# Patient Record
Sex: MALE | Race: OTHER | Employment: FULL TIME | URBAN - METROPOLITAN AREA
[De-identification: names, ages, dates, MRNs, and addresses within clinical notes are randomized per-mention and may not be internally consistent; named-entity substitution may affect disease eponyms.]

---

## 2022-06-02 ENCOUNTER — HOSPITAL ENCOUNTER (EMERGENCY)
Age: 22
Discharge: HOME | End: 2022-06-02
Attending: EMERGENCY MEDICINE
Payer: MEDICAID

## 2022-06-02 VITALS
RESPIRATION RATE: 18 BRPM | BODY MASS INDEX: 20.05 KG/M2 | OXYGEN SATURATION: 100 % | DIASTOLIC BLOOD PRESSURE: 63 MMHG | WEIGHT: 132.28 LBS | HEIGHT: 68 IN | TEMPERATURE: 98.4 F | HEART RATE: 67 BPM | SYSTOLIC BLOOD PRESSURE: 115 MMHG

## 2022-06-02 DIAGNOSIS — Z20.822 PERSON UNDER INVESTIGATION FOR COVID-19: ICD-10-CM

## 2022-06-02 DIAGNOSIS — J06.9 VIRAL URI WITH COUGH: Primary | ICD-10-CM

## 2022-06-02 LAB — SARS-COV-2, COV2: NORMAL

## 2022-06-02 PROCEDURE — 99283 EMERGENCY DEPT VISIT LOW MDM: CPT

## 2022-06-02 PROCEDURE — U0003 INFECTIOUS AGENT DETECTION BY NUCLEIC ACID (DNA OR RNA); SEVERE ACUTE RESPIRATORY SYNDROME CORONAVIRUS 2 (SARS-COV-2) (CORONAVIRUS DISEASE [COVID-19]), AMPLIFIED PROBE TECHNIQUE, MAKING USE OF HIGH THROUGHPUT TECHNOLOGIES AS DESCRIBED BY CMS-2020-01-R: HCPCS

## 2022-06-02 NOTE — Clinical Note
[facility] [place]  [facility]  [address]  [facility] [address] [phone]    Work/School Note    Date: 6/2/2022     To Whom It May concern:    Leyla Coy was evaluated by the following provider(s):  Attending Provider: Camryn Oliva MD  Physician Assistant: Fausto Thurman virus is suspected. Per the CDC guidelines we recommend home isolation until the following conditions are all met:    1. At least five days have passed since symptoms first appeared and/or had a close exposure,   2. After home isolation for five days, wearing a mask around others for the next five days,  3. At least 24 have passed since last fever without the use of fever-reducing medications and  4.  Symptoms (eg cough, shortness of breath) have improved      Sincerely,          VERA Beavers

## 2022-06-02 NOTE — ED PROVIDER NOTES
EMERGENCY DEPARTMENT HISTORY AND PHYSICAL EXAM    Date: 6/2/2022  Patient Name: Beth Murrieta    History of Presenting Illness     Time Seen: 2:42 PM    Chief Complaint   Patient presents with    Covid Testing       History Provided By: Patient    Additional History (Context):   Beth Murrieta is a 22 y.o. male presents to the emergency room after being sent here by his boss for rule out COVID. Patient states that he has had cold and cough symptoms for the last several days. Patient notes that at work several people have been diagnosed as positive. He has had increased nasal congestion drainage. Cough. No shortness of breath or wheezing. No ear pain or sore throat. Some muscle aches. Low-grade fever. Chills. Appetite and fluid intake are okay. Patient is vaccinated. Last vaccination about 7 months ago. PCP: Unknown, Provider, MD        Past History     Past Medical History:  History reviewed. No pertinent past medical history. Past Surgical History:  History reviewed. No pertinent surgical history. Family History:  History reviewed. No pertinent family history. Social History:  Social History     Tobacco Use    Smoking status: Current Every Day Smoker     Packs/day: 1.00    Smokeless tobacco: Never Used   Substance Use Topics    Alcohol use: Not Currently    Drug use: Never       Allergies:  No Known Allergies      Review of Systems   Review of Systems   Constitutional: Positive for chills and fever. HENT: Positive for congestion and rhinorrhea. Negative for ear discharge, ear pain, postnasal drip, sinus pressure, sinus pain and sore throat. Respiratory: Positive for cough. Negative for shortness of breath. Cardiovascular: Negative for chest pain. Gastrointestinal: Negative for abdominal pain, diarrhea, nausea and vomiting. Skin: Negative for rash. Neurological: Negative for dizziness, light-headedness and headaches.        Physical Exam     Vitals:    06/02/22 1433 06/02/22 1517   BP: 115/63    Pulse: 67    Resp: 18    Temp: 98.4 °F (36.9 °C)    SpO2: 100% 100%   Weight: 60 kg (132 lb 4.4 oz)    Height: 5' 8.11\" (1.73 m)      Physical Exam  Vitals and nursing note reviewed. Constitutional:       Appearance: Normal appearance. He is well-developed, well-groomed and normal weight. He is not ill-appearing. Comments: 22year-old   HENT:      Right Ear: Tympanic membrane and ear canal normal.      Left Ear: Tympanic membrane and ear canal normal.      Nose: Congestion and rhinorrhea present. Mouth/Throat:      Mouth: Mucous membranes are moist.      Pharynx: Oropharynx is clear. Eyes:      Extraocular Movements: Extraocular movements intact. Conjunctiva/sclera: Conjunctivae normal.      Pupils: Pupils are equal, round, and reactive to light. Cardiovascular:      Rate and Rhythm: Normal rate and regular rhythm. Heart sounds: Normal heart sounds. Pulmonary:      Effort: Pulmonary effort is normal.      Breath sounds: Normal breath sounds. Musculoskeletal:      Cervical back: Neck supple. Lymphadenopathy:      Cervical: No cervical adenopathy. Skin:     General: Skin is warm and dry. Neurological:      Mental Status: He is alert and oriented to person, place, and time. Psychiatric:         Behavior: Behavior is cooperative. Nursing note and vitals reviewed         Diagnostic Study Results     Labs -     Recent Results (from the past 12 hour(s))   SARS-COV-2    Collection Time: 06/02/22  1:56 PM   Result Value Ref Range    SARS-CoV-2 by PCR Please find results under separate order         Radiologic Studies   No orders to display     CT Results  (Last 48 hours)    None        CXR Results  (Last 48 hours)    None            Medical Decision Making   I am the first provider for this patient. I reviewed the vital signs, available nursing notes, past medical history, past surgical history, family history and social history.     Vital Signs-Reviewed the patient's vital signs. Pulse Oximetry Analysis 100% on room air    Records Reviewed: Nursing Notes    DDX:  Viral URI with cough  Rule out COVID    Procedures:  Procedures    ED Course:   Initial assessment performed. The patients presenting problems have been discussed, and they are in agreement with the care plan formulated and outlined with them. I have encouraged them to ask questions as they arise throughout their visit. ED Physician Discussion Note:   Patient is going to be tested for COVID  Symptomatic relief otherwise  Work note until results are obtained    Diagnosis and Disposition       DISCHARGE NOTE:  2:54 PM    Andreas Feliciano's  results have been reviewed with him. He has been counseled regarding his diagnosis, treatment, and plan. He verbally conveys understanding and agreement of the signs, symptoms, diagnosis, treatment and prognosis and additionally agrees to follow up as discussed. He also agrees with the care-plan and conveys that all of his questions have been answered. I have also provided discharge instructions for him that include: educational information regarding their diagnosis and treatment, and list of reasons why they would want to return to the ED prior to their follow-up appointment, should his condition change. He has been provided with education for proper emergency department utilization. CLINICAL IMPRESSION:    1. Viral URI with cough    2. Person under investigation for COVID-19        PLAN:  1. D/C Home  2. There are no discharge medications for this patient.     3.   Follow-up Information     Follow up With Specialties Details Why Contact Info    Primary care provider  Go to       [facility] EMERGENCY DEPT Emergency Medicine  If symptoms worsen, As needed [address] [id number]  [phone]        ____________________________________     Please note that this dictation was completed with [employer], the computer voice recognition software. Quite often unanticipated grammatical, syntax, homophones, and other interpretive errors are inadvertently transcribed by the computer software. Please disregard these errors. Please excuse any errors that have escaped final proofreading.

## 2022-06-02 NOTE — DISCHARGE INSTRUCTIONS
Tylenol/Motrin for fever  Push liquids. Keep well-hydrated  Over-the-counter cold and cough medication  Work note until results have been obtained for COVID.   Results should be back in 12 to 24 hours

## 2022-06-03 ENCOUNTER — PATIENT OUTREACH (OUTPATIENT)
Dept: CASE MANAGEMENT | Age: 22
End: 2022-06-03

## 2022-06-03 LAB — SARS-COV-2, NAA: DETECTED

## 2022-06-03 NOTE — PROGRESS NOTES
Patient contacted regarding [id number] concern for covid. Discussed COVID-19 related testing which was pending at this time. Test results were pending. Patient informed of results, if available? no.     LPN Care Coordinator contacted the patient by telephone to perform post discharge assessment. Call within 2 business days of discharge: Yes Verified name and  with patient as identifiers. Provided introduction to self, and explanation of the CTN/ACM role, and reason for call due to risk factors for infection and/or exposure to COVID-19. Symptoms reviewed with patient who verbalized the following symptoms: fever and cough      Due to no new or worsening symptoms encounter was not routed to provider for escalation. Discussed follow-up appointments. If no appointment was previously scheduled, appointment scheduling offered:  no. [facility] No future appointments. [facility] follow up appointment(s): PT will schedule with PCP    Interventions to address risk factors: Education of patient/family/caregiver/guardian to support self-management-monitor fever, take fever reducing medications     Advance Care Planning:   Does patient have an Advance Directive: not on file. Educated patient about risk for severe COVID-19 due to risk factors according to CDC guidelines. LPN CC reviewed discharge instructions, medical action plan and red flag symptoms with the patient who verbalized understanding. Discussed COVID vaccination status: no. Education provided on COVID-19 vaccination as appropriate. Discussed exposure protocols and quarantine with CDC Guidelines. Patient was given an opportunity to verbalize any questions and concerns and agrees to contact LPN CC or health care provider for questions related to their healthcare. Reviewed and educated patient on any new and changed medications related to discharge diagnosis     Was patient discharged with a pulse oximeter?  no Discussed and confirmed discharge instructions and when to notify provider or seek emergency care. LPN CC provided contact information. Plan for follow-up call in 3-5 days based on severity of symptoms and risk factors.

## 2022-06-06 NOTE — PROGRESS NOTES
Patient contacted regarding COVID-19 diagnosis. Discussed COVID-19 related testing which was available at this time. Test results were positive. Patient informed of results, if available? yes. Patient was given + results. He was instructed to wear a mask around others for the next 10 days. His fever has broken and he feels a bit better but still not 100% normal. Will contact pt in 5 days to follow up on symptoms. Pt did not have any questions or concerns during call.

## 2022-06-12 ENCOUNTER — HOSPITAL ENCOUNTER (EMERGENCY)
Age: 22
Discharge: STILL ADMITTED OR ADMINISTRATIVE | End: 2022-06-12

## 2022-06-12 ENCOUNTER — HOSPITAL ENCOUNTER (EMERGENCY)
Age: 22
Discharge: HOME | End: 2022-06-12
Attending: EMERGENCY MEDICINE
Payer: MEDICAID

## 2022-06-12 VITALS
TEMPERATURE: 97.6 F | HEART RATE: 99 BPM | DIASTOLIC BLOOD PRESSURE: 78 MMHG | OXYGEN SATURATION: 100 % | RESPIRATION RATE: 16 BRPM | SYSTOLIC BLOOD PRESSURE: 136 MMHG

## 2022-06-12 DIAGNOSIS — R51.9 ACUTE NONINTRACTABLE HEADACHE, UNSPECIFIED HEADACHE TYPE: Primary | ICD-10-CM

## 2022-06-12 DIAGNOSIS — U07.1 COVID-19: ICD-10-CM

## 2022-06-12 PROCEDURE — 99282 EMERGENCY DEPT VISIT SF MDM: CPT

## 2022-06-12 NOTE — LETTER
[facility] [place]  [facility]  [address]  [facility] [address] [phone]    Work/School Note    Date: 6/12/2022    To Whom It May concern:    Denny Bynum was seen and treated today in the emergency room by the following provider(s):  Attending Provider: Alem Delgado MD  Physician Assistant: VERA Meek. Denny Bynum  -please excuse patient from work due to COVID infection. He was originally seen here on June 2, 2022. His results came back within the next 24 hours noting that he was positive for COVID. Please excuse patient from work through June 14, 2022 due to his illness as he remains symptomatic. He can return to work on Shanda 15, 2022.     Sincerely,          VERA Montoya

## 2022-06-13 ENCOUNTER — PATIENT OUTREACH (OUTPATIENT)
Dept: CASE MANAGEMENT | Age: 22
End: 2022-06-13

## 2022-06-13 NOTE — ED TRIAGE NOTES
Pt c/o of headache that started today. Pt had covid 6/2/22 and needs a work note to excuse him from that date to today. Pt states he gets headaches and it interferes with work.

## 2022-06-13 NOTE — ED PROVIDER NOTES
EMERGENCY DEPARTMENT HISTORY AND PHYSICAL EXAM    Date: 6/12/2022  Patient Name: Polina Vegas    History of Presenting Illness     Time Seen: 10:20 PM    Chief Complaint   Patient presents with    Headache       History Provided By: Patient and     Additional History (Context):   Polina Vegas is a 22 y.o. male who presents to the emergency room with c/o headache, \"blackout spells\". Global headache. Throbbing. Intermittent. Nonradiating. Also complains of blackout spells that happen for a few seconds after sitting or standing. Quickly go away. Has not had a syncopal episode. Patient was diagnosed with COVID approximately 10 days ago here at this emergency room. Still having a little nasal congestion. No cough. No shortness of breath. Energy is improving. Patient has missed work due to his illness. PCP: Unknown, Provider, MD        Past History     Past Medical History:  No past medical history on file. Past Surgical History:  No past surgical history on file. Family History:  No family history on file. Social History:  Social History     Tobacco Use    Smoking status: Current Every Day Smoker     Packs/day: 1.00    Smokeless tobacco: Never Used   Substance Use Topics    Alcohol use: Not Currently    Drug use: Never       Allergies:  No Known Allergies      Review of Systems   Review of Systems   Constitutional: Negative for chills, fatigue and fever. HENT: Positive for congestion. Negative for ear discharge, ear pain, rhinorrhea, sinus pressure, sinus pain, sneezing and sore throat. Respiratory: Negative for cough and shortness of breath. Cardiovascular: Negative for chest pain. Gastrointestinal: Negative for abdominal pain, diarrhea and vomiting. Neurological: Positive for headaches. Blackout spells   All other systems reviewed and are negative.       Physical Exam     Vitals:    06/12/22 2134 06/12/22 2240   BP: 136/78    Pulse: 99    Resp: 16    Temp: 97.6 °F (36.4 °C)    SpO2: 100% 100%     Physical Exam  Vitals and nursing note reviewed. Constitutional:       General: He is not in acute distress. Appearance: Normal appearance. He is normal weight. HENT:      Right Ear: Tympanic membrane and ear canal normal.      Left Ear: Tympanic membrane and ear canal normal.      Nose: Congestion present. No rhinorrhea. Mouth/Throat:      Mouth: Mucous membranes are moist.      Pharynx: Oropharynx is clear. Eyes:      Conjunctiva/sclera: Conjunctivae normal.      Pupils: Pupils are equal, round, and reactive to light. Cardiovascular:      Rate and Rhythm: Normal rate and regular rhythm. Heart sounds: Normal heart sounds. Pulmonary:      Effort: Pulmonary effort is normal.      Breath sounds: Normal breath sounds. Musculoskeletal:      Cervical back: Neck supple. Lymphadenopathy:      Cervical: No cervical adenopathy. Skin:     General: Skin is warm and dry. Neurological:      Mental Status: He is alert and oriented to person, place, and time. Nursing note and vitals reviewed      Diagnostic Study Results     Labs -   No results found for this or any previous visit (from the past 24 hour(s)). Radiologic Studies   No orders to display     CT Results  (Last 48 hours)    None        CXR Results  (Last 48 hours)    None            Medical Decision Making   I am the first provider for this patient. I reviewed the vital signs, available nursing notes, past medical history, past surgical history, family history and social history. Vital Signs-Reviewed the patient's vital signs. Pulse Oximetry Analysis 100% on room air. Records Reviewed: Nursing Notes    DDX:  Recent COVID infection  Possible hydration issues  Residual headaches/congestion    Procedures:  Procedures    ED Course:   Initial assessment performed.  The patients presenting problems have been discussed, and they are in agreement with the care plan formulated and outlined with them. I have encouraged them to ask questions as they arise throughout their visit. ED Physician Discussion Note:   Patient looks great  Patient has missed a lot of work due to his COVID infection. We will make sure that he gets a note to cover him for work that he has missed. He can go back on Shanda 15, 2012. Symptomatic relief otherwise  Discharge    Diagnosis and Disposition       DISCHARGE NOTE:  Lupillo Feliciano's  results have been reviewed with him. He has been counseled regarding his diagnosis, treatment, and plan. He verbally conveys understanding and agreement of the signs, symptoms, diagnosis, treatment and prognosis and additionally agrees to follow up as discussed. He also agrees with the care-plan and conveys that all of his questions have been answered. I have also provided discharge instructions for him that include: educational information regarding their diagnosis and treatment, and list of reasons why they would want to return to the ED prior to their follow-up appointment, should his condition change. He has been provided with education for proper emergency department utilization. CLINICAL IMPRESSION:    1. Acute nonintractable headache, unspecified headache type    2. COVID-19        PLAN:  1. D/C Home  2. There are no discharge medications for this patient. 3.   Follow-up Information     Follow up With Specialties Details Why Contact Info    Primary care provider  Go to  Follow-up with your PCP     [facility] Emergency Medicine  If symptoms worsen, As needed [address] [facility] [address]  [phone]        ____________________________________     Please note that this dictation was completed with [employer], the [employer] voice recognition software. Quite often unanticipated grammatical, syntax, homophones, and other interpretive errors are inadvertently transcribed by the computer software. Please disregard these errors.   Please excuse any errors that have escaped final proofreading.

## 2022-06-13 NOTE — PROGRESS NOTES
Patient resolved from [address] Transitions episode on 6/2/22. Discussed COVID-19 related testing which was available at this time. Test results were positive. Patient informed of results, if available? yes     Patient/family has been provided the following resources and education related to COVID-19:                         Signs, symptoms and red flags related to COVID-19            CDC exposure and quarantine guidelines            [employer] exposure contact - [phone]            Contact for their local Department of Health                 Patient currently reports that the following symptoms have imtproved:  cough and chills or shaking. Patient is still experiencing an intermittent headache but it is improving. Pt educated on symptomatic tx and to return to ER if experiencing any overt/concerning sx. He verbalized understanding    No further outreach scheduled with this CTN/ACM/LPN/HC/ MA. Episode of Care resolved. Patient has this CTN/ACM/LPN/HC/MA contact information if future needs arise.

## 2022-06-13 NOTE — DISCHARGE INSTRUCTIONS
Tylenol/Motrin for headache  Push liquids.   Keep well-hydrated  Over-the-counter cold and cough medication if needed  Return to work on Shanda 15, 2022

## 2022-07-22 ENCOUNTER — HOSPITAL ENCOUNTER (EMERGENCY)
Age: 22
Discharge: HOME | End: 2022-07-22
Attending: EMERGENCY MEDICINE
Payer: MEDICAID

## 2022-07-22 VITALS
SYSTOLIC BLOOD PRESSURE: 132 MMHG | DIASTOLIC BLOOD PRESSURE: 77 MMHG | OXYGEN SATURATION: 97 % | TEMPERATURE: 97.7 F | HEART RATE: 82 BPM | RESPIRATION RATE: 14 BRPM

## 2022-07-22 DIAGNOSIS — R51.9 FACIAL PAIN: Primary | ICD-10-CM

## 2022-07-22 DIAGNOSIS — K02.9 DENTAL CARIES: ICD-10-CM

## 2022-07-22 DIAGNOSIS — K08.89 PAIN, DENTAL: ICD-10-CM

## 2022-07-22 PROCEDURE — 74011250637 HC RX REV CODE- 250/637: Performed by: PHYSICIAN ASSISTANT

## 2022-07-22 PROCEDURE — 99283 EMERGENCY DEPT VISIT LOW MDM: CPT

## 2022-07-22 RX ORDER — CLINDAMYCIN HYDROCHLORIDE 300 MG/1
300 CAPSULE ORAL 3 TIMES DAILY
Qty: 21 CAPSULE | Refills: 0 | Status: SHIPPED | OUTPATIENT
Start: 2022-07-22 | End: 2022-07-29

## 2022-07-22 RX ORDER — IBUPROFEN 600 MG/1
600 TABLET ORAL
Qty: 20 TABLET | Refills: 0 | Status: SHIPPED | OUTPATIENT
Start: 2022-07-22

## 2022-07-22 RX ORDER — IBUPROFEN 600 MG/1
600 TABLET ORAL
Status: COMPLETED | OUTPATIENT
Start: 2022-07-22 | End: 2022-07-22

## 2022-07-22 RX ADMIN — IBUPROFEN 600 MG: 600 TABLET ORAL at 17:09

## 2022-07-22 NOTE — ED TRIAGE NOTES
Pt presents for L lower dental pain.  No dental f/u Post-Care Instructions: I reviewed with the patient in detail post-care instructions. Patient is to wear sunprotection, and avoid picking at any of the treated lesions. Pt may apply Vaseline to crusted or scabbing areas. Consent: The patient's consent was obtained including but not limited to risks of crusting, scabbing, blistering, scarring, darker or lighter pigmentary change, recurrence, incomplete removal and infection. Detail Level: Simple Duration Of Freeze Thaw-Cycle (Seconds): 5 Number Of Freeze-Thaw Cycles: 1 freeze-thaw cycle Render Post-Care Instructions In Note?: yes

## 2022-07-22 NOTE — ED PROVIDER NOTES
EMERGENCY DEPARTMENT HISTORY AND PHYSICAL EXAM    Date: 7/22/2022  Patient Name: Eddi Nieto    History of Presenting Illness     Time Seen:4:54 PM    Chief Complaint   Patient presents with    Dental Pain       History Provided By: Patient    Additional History (Context):   Eddi Nieto is a 22 y.o. male who presents to the emergency room with c/o left lower dental pain. Constant aching pain to left side of his face/jaw with radiation up into the left side of his head. Throbbing pain. Tried topical medication without any relief. Denies any bleeding or drainage coming from the tooth. No jaw stiffness. Afebrile. No facial swelling. Has a scheduled appointment for dentistry on Monday. PCP: Other, None, PA        Past History     Past Medical History:  No past medical history on file. Past Surgical History:  No past surgical history on file. Family History:  No family history on file. Social History:  Social History     Tobacco Use    Smoking status: Every Day     Packs/day: 1.00     Types: Cigarettes    Smokeless tobacco: Never   Substance Use Topics    Alcohol use: Not Currently    Drug use: Never       Allergies:  No Known Allergies      Review of Systems   Review of Systems   Constitutional:  Negative for chills and fever. HENT:  Positive for dental problem and ear pain. Negative for drooling, facial swelling, sinus pressure, sinus pain, sore throat and trouble swallowing. Neurological:  Positive for headaches. All other systems reviewed and are negative. Physical Exam     Vitals:    07/22/22 1648   BP: 132/77   Pulse: 82   Resp: 14   Temp: 97.7 °F (36.5 °C)   SpO2: 97%     Physical Exam  Vitals and nursing note reviewed. Constitutional:       General: He is not in acute distress. Appearance: Normal appearance. He is well-developed, well-groomed and normal weight. HENT:      Mouth/Throat:      Mouth: Mucous membranes are moist.      Dentition: Abnormal dentition.  Dental tenderness and dental caries present. No gingival swelling or dental abscesses. Pharynx: Oropharynx is clear. Comments: Tooth #17 -large dental esperanza noted. Large portion of the tooth centrally is affected. No active bleeding or drainage coming from the tooth. Tooth is stable. However very painful to percussion. There is no obvious abscess seen. Cardiovascular:      Rate and Rhythm: Normal rate and regular rhythm. Heart sounds: Normal heart sounds. Pulmonary:      Effort: Pulmonary effort is normal.      Breath sounds: Normal breath sounds. Musculoskeletal:      Cervical back: Neck supple. Lymphadenopathy:      Cervical: No cervical adenopathy. Neurological:      Mental Status: He is alert and oriented to person, place, and time. Psychiatric:         Behavior: Behavior is cooperative. Nursing note and vitals reviewed      Diagnostic Study Results     Labs -   No results found for this or any previous visit (from the past 24 hour(s)). Radiologic Studies   No orders to display     CT Results  (Last 48 hours)      None          CXR Results  (Last 48 hours)      None              Medical Decision Making   I am the first provider for this patient. I reviewed the vital signs, available nursing notes, past medical history, past surgical history, family history and social history. Vital Signs-Reviewed the patient's vital signs. Records Reviewed: Nursing Notes    DDX: Facial pain/dental pain  Dental caries  Possible early abscess    Procedures:  Procedures    ED Course:   Initial assessment performed. The patients presenting problems have been discussed, and they are in agreement with the care plan formulated and outlined with them. I have encouraged them to ask questions as they arise throughout their visit.          ED Physician Discussion Note:   Patient will be placed on antibiotic as well as some ibuprofen for pain  Dentist appointment on Monday      I, Asad Berumen PA-C, was the primary care giver for this patient during this emergency room visit. Diagnosis and Disposition       DISCHARGE NOTE:  Cintia Feliciano's  results have been reviewed with him. He has been counseled regarding his diagnosis, treatment, and plan. He verbally conveys understanding and agreement of the signs, symptoms, diagnosis, treatment and prognosis and additionally agrees to follow up as discussed. He also agrees with the care-plan and conveys that all of his questions have been answered. I have also provided discharge instructions for him that include: educational information regarding their diagnosis and treatment, and list of reasons why they would want to return to the ED prior to their follow-up appointment, should his condition change. He has been provided with education for proper emergency department utilization. CLINICAL IMPRESSION:    1. Facial pain    2. Pain, dental    3. Dental caries        PLAN:  1. D/C Home  2. Discharge Medication List as of 7/22/2022  5:24 PM        START taking these medications    Details   ibuprofen (MOTRIN) 600 mg tablet Take 1 Tablet by mouth every eight (8) hours as needed for Pain., Normal, Disp-20 Tablet, R-0      clindamycin (CLEOCIN) 300 mg capsule Take 1 Capsule by mouth three (3) times daily for 7 days. , Normal, Disp-21 Capsule, R-0           3. Follow-up Information       Follow up With Specialties Details Why Contact Info    Dentist  Call  Call dentist soon as possible for follow-up. Keep appointment for Monday     [facility] EM[facility] Emergency Medicine  If symptoms worsen, As needed [address] [id number]  [phone]          ____________________________________     Please note that this dictation was completed with [employer], the computer voice recognition software. Quite often unanticipated grammatical, syntax, homophones, and other interpretive errors are inadvertently transcribed by the computer software.   Please disregard these errors. Please excuse any errors that have escaped final proofreading.

## 2022-07-22 NOTE — DISCHARGE INSTRUCTIONS
Today you were evaluated for dental pain  You may continue to use topical medication like Anbesol or Orajel  Medications as prescribed  Avoid chewing on left side  Keep your appointment with dental clinic on Monday

## 2022-12-06 PROCEDURE — 75810000293 HC SIMP/SUPERF WND  RPR

## 2022-12-06 PROCEDURE — 99284 EMERGENCY DEPT VISIT MOD MDM: CPT

## 2022-12-07 ENCOUNTER — HOSPITAL ENCOUNTER (EMERGENCY)
Age: 22
Discharge: HOME | End: 2022-12-07
Attending: EMERGENCY MEDICINE
Payer: MEDICAID

## 2022-12-07 ENCOUNTER — APPOINTMENT (OUTPATIENT)
Dept: CT IMAGING | Age: 22
End: 2022-12-07
Attending: EMERGENCY MEDICINE
Payer: MEDICAID

## 2022-12-07 VITALS
RESPIRATION RATE: 18 BRPM | BODY MASS INDEX: 21.38 KG/M2 | TEMPERATURE: 98.7 F | WEIGHT: 141.09 LBS | SYSTOLIC BLOOD PRESSURE: 100 MMHG | HEART RATE: 80 BPM | DIASTOLIC BLOOD PRESSURE: 50 MMHG | HEIGHT: 68 IN | OXYGEN SATURATION: 97 %

## 2022-12-07 DIAGNOSIS — S01.01XA LACERATION OF SCALP, INITIAL ENCOUNTER: Primary | ICD-10-CM

## 2022-12-07 DIAGNOSIS — S09.90XA MINOR HEAD INJURY, INITIAL ENCOUNTER: ICD-10-CM

## 2022-12-07 PROCEDURE — 77030002986 HC SUT PROL J&J -A

## 2022-12-07 PROCEDURE — 75810000293 HC SIMP/SUPERF WND  RPR

## 2022-12-07 PROCEDURE — 70450 CT HEAD/BRAIN W/O DYE: CPT

## 2022-12-07 RX ORDER — LIDOCAINE HYDROCHLORIDE AND EPINEPHRINE 10; 10 MG/ML; UG/ML
1.5 INJECTION, SOLUTION INFILTRATION; PERINEURAL ONCE
Status: DISCONTINUED | OUTPATIENT
Start: 2022-12-07 | End: 2022-12-07 | Stop reason: HOSPADM

## 2022-12-07 RX ORDER — LIDOCAINE HYDROCHLORIDE AND EPINEPHRINE 10; 10 MG/ML; UG/ML
INJECTION, SOLUTION INFILTRATION; PERINEURAL
Status: DISCONTINUED
Start: 2022-12-07 | End: 2022-12-07 | Stop reason: HOSPADM

## 2022-12-07 NOTE — ED PROVIDER NOTES
EMERGENCY DEPARTMENT HISTORY AND PHYSICAL EXAM    Date: 12/7/2022  Patient Name: Kelsea Francis    History of Presenting Illness     Chief Complaint   Patient presents with    Head Injury    Laceration         History Provided By: Patient    Additional History (Context):   Initial treatment started by VERA Shaffer  Kelsea Francis is a 25 y.o. male with PMHX of no medical problems who presents to the emergency department C/O head injury and laceration. Patient had a near syncopal episode dizzy lightheaded after striking his head on shelving 1 stocking. Bleeding was controlled with direct pressure. He reports vision became blurry as if he was going to blackout. He denies any nausea or vomiting. He has no motor or sensory changes. Social History  He knowledges smoking cigarettes although, he denies drinking or drugs      Family History  No bleeding disorders    PCP: Unknown, Provider, MD        Past History     Past Medical History:  History reviewed. No pertinent past medical history. Past Surgical History:  History reviewed. No pertinent surgical history. Family History:  History reviewed. No pertinent family history. Social History:  Social History     Tobacco Use    Smoking status: Every Day     Packs/day: 1.00     Types: Cigarettes    Smokeless tobacco: Never   Substance Use Topics    Alcohol use: Not Currently    Drug use: Never       Allergies:  No Known Allergies      Review of Systems   Review of Systems   Eyes:  Positive for visual disturbance (Now resolved). Skin:  Positive for wound. Neurological:  Positive for syncope (Near syncope), light-headedness and headaches. All other systems reviewed and are negative. Physical Exam     Vitals:    12/07/22 0005   BP: (!) 100/50   Pulse: 80   Resp: 18   Temp: 98.7 °F (37.1 °C)   SpO2: 97%   Weight: 64 kg (141 lb 1.5 oz)   Height: 5' 8\" (1.727 m)     Physical Exam  Vitals and nursing note reviewed.    Constitutional:       General: He is not in acute distress. Appearance: He is well-developed. He is not diaphoretic. HENT:      Head: Normocephalic. Comments: Scalp lac just over 2 cm, galea not compromised, no foreign body  Eyes:      General: No scleral icterus. Extraocular Movements:      Right eye: Normal extraocular motion. Left eye: Normal extraocular motion. Conjunctiva/sclera: Conjunctivae normal.      Pupils: Pupils are equal, round, and reactive to light. Neck:      Trachea: No tracheal deviation. Cardiovascular:      Rate and Rhythm: Normal rate and regular rhythm. Heart sounds: Normal heart sounds. Pulmonary:      Effort: Pulmonary effort is normal. No respiratory distress. Breath sounds: Normal breath sounds. No stridor. Abdominal:      General: Bowel sounds are normal. There is no distension. Palpations: Abdomen is soft. Tenderness: There is no abdominal tenderness. There is no rebound. Musculoskeletal:         General: No tenderness. Normal range of motion. Cervical back: Normal range of motion and neck supple. Comments: Grossly unremarkable without abnormalities   Skin:     General: Skin is warm and dry. Capillary Refill: Capillary refill takes less than 2 seconds. Findings: No erythema or rash. Neurological:      Mental Status: He is alert and oriented to person, place, and time. GCS: GCS eye subscore is 4. GCS verbal subscore is 5. GCS motor subscore is 6. Cranial Nerves: No cranial nerve deficit. Motor: No weakness. Coordination: Finger-Nose-Finger Test normal.      Comments: Distal radial medial ulnar nerve function normal.   Psychiatric:         Mood and Affect: Mood normal.         Speech: Speech normal.         Behavior: Behavior normal.         Thought Content:  Thought content normal.         Judgment: Judgment normal.     Diagnostic Study Results     Labs -  No results found for this or any previous visit (from the past 24 hour(s)). Radiologic Studies -   CT HEAD WO CONT   Final Result      No acute intracranial abnormalities. CT Results  (Last 48 hours)                 12/07/22 0055  CT HEAD WO CONT Final result    Impression:      No acute intracranial abnormalities. Narrative:  EXAM: CT head       INDICATION: Injury. Pain. COMPARISON: None. TECHNIQUE: Axial CT imaging of the head was performed without intravenous   contrast. Dose reduction techniques used: automated exposure control, adjustment   of the mAs and/or kVp according to patient size, and iterative reconstruction   techniques. Digital imaging and communications in Medicine (DICOM) format image   data are available to nonaffiliated external healthcare facilities or entities   on a secure, media free, reciprocally searchable basis with patient   authorization for at least 12 months after this study. _______________       FINDINGS:       BRAIN AND POSTERIOR FOSSA: The sulci, folia, ventricles and basal cisterns are   within normal limits for the patient's age. There is no intracranial hemorrhage,   mass effect, or midline shift. There are no areas of abnormal parenchymal   attenuation. EXTRA-AXIAL SPACES AND MENINGES: There are no abnormal extra-axial fluid   collections. CALVARIUM: Intact. SINUSES: Clear. OTHER: None.       _______________                 CXR Results  (Last 48 hours)      None            Medications given in the ED-  Medications - No data to display      Medical Decision Making   I am the first provider for this patient. I reviewed the vital signs, available nursing notes, past medical history, past surgical history, family history and social history. Vital Signs-Reviewed the patient's vital signs.     Pulse Oximetry Analysis - 97% on room air    Records Reviewed: NURSING NOTES AND PREVIOUS MEDICAL RECORDS    Provider Notes (Medical Decision Making):   Patient with near syncopal episode after striking his head. Lacerations was clean. Reportedly there was no evidence of foreign body when this had an ill cleaned and closed this wound with 3 staples. Dry CT came back unremarkable for foreign body skull fracture or intracranial bleeding or swelling. Procedures:  Wound Repair    Date/Time: 12/7/2022 2:00 AM  Performed by: Marybel details: scalp  Wound length:2.5 cm or less  Foreign bodies: no foreign bodies  Irrigation solution: saline  Debridement: none  Skin closure: staples  Number of sutures: 3  Technique: simple and interrupted  Approximation: close  Dressing: antibiotic ointment  Comments: Length, just over 2 cm      ED Course:   Initial treatment started by Jakob Sanchez: Initial assessment performed. The patients presenting problems have been discussed, and they are in agreement with the care plan formulated and outlined with them. I have encouraged them to ask questions as they arise throughout their visit. Diagnosis and Disposition       DISCHARGE NOTE:  Discharge during downtime  Samuel Maudekwadwo's  results have been reviewed with him. He has been counseled regarding his diagnosis, treatment, and plan. He verbally conveys understanding and agreement of the signs, symptoms, diagnosis, treatment and prognosis and additionally agrees to follow up as discussed. He also agrees with the care-plan and conveys that all of his questions have been answered. I have also provided discharge instructions for him that include: educational information regarding their diagnosis and treatment, and list of reasons why they would want to return to the ED prior to their follow-up appointment, should his condition change. He has been provided with education for proper emergency department utilization. CLINICAL IMPRESSION:    1. Laceration of scalp, initial encounter    2. Minor head injury, initial encounter        PLAN:  1. D/C Home  2. There are no discharge medications for this patient.     3. Follow-up Information    None       _______________________________    This note was partially transcribed via voice recognition software. Although efforts have been made to catch any discrepancies, it may contain sound alike words, grammatical errors, or nonsensical words.

## 2022-12-07 NOTE — ED NOTES
The documentation for this period is being entered following the guidelines as defined in the [facility] policy by Gonzalez Sanches RN. Pt received 4 staples to the top of his head. Pt CT results were normal and pt was discharged with prescription for ABX. Pt is alert and oriented x4.

## 2022-12-07 NOTE — ED TRIAGE NOTES
Patient arrives ambulatory to ED with c/c of head injury and laceration noted to the top of his head. 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 He reports when he did that he almost had a syncopal episode, he states \"my eyes became black\", when he came too he noted a large amount of blood coming from his head. Patient reports this happened around 6pm this evening. Bleeding is controlled at this point.